# Patient Record
Sex: FEMALE | Race: WHITE
[De-identification: names, ages, dates, MRNs, and addresses within clinical notes are randomized per-mention and may not be internally consistent; named-entity substitution may affect disease eponyms.]

---

## 2017-03-27 ENCOUNTER — HOSPITAL ENCOUNTER (OUTPATIENT)
Dept: HOSPITAL 45 - C.RADPV | Age: 67
Discharge: HOME | End: 2017-03-27
Attending: FAMILY MEDICINE
Payer: COMMERCIAL

## 2017-03-27 DIAGNOSIS — M25.561: Primary | ICD-10-CM

## 2017-03-27 DIAGNOSIS — M85.861: ICD-10-CM

## 2017-03-27 NOTE — DIAGNOSTIC IMAGING REPORT
RIGHT KNEE 2 VIEWS



CLINICAL HISTORY: Right knee pain.



FINDINGS: AP and crosstable lateral views of the right knee are obtained. No

prior studies are available for comparison at the time of dictation. The

skeletal structures are osteopenic. No fracture is seen. There is mild

tricompartmental degenerative joint space narrowing, greatest at the medial and

patellofemoral compartments. There are medial marginal osteophytes and beaking

of the tibial spine. No joint effusion is identified. The overlying soft tissues

are within normal limits.



IMPRESSION: Osteopenia and mild degenerative change as above. No acute bony

abnormality is seen.







Electronically signed by:  Joni Wright M.D.

3/27/2017 2:02 PM



Dictated Date/Time:  3/27/2017 2:01 PM

## 2017-04-20 ENCOUNTER — HOSPITAL ENCOUNTER (OUTPATIENT)
Dept: HOSPITAL 45 - C.CTS | Age: 67
Discharge: HOME | End: 2017-04-20
Attending: INTERNAL MEDICINE
Payer: COMMERCIAL

## 2017-04-20 DIAGNOSIS — K76.0: ICD-10-CM

## 2017-04-20 DIAGNOSIS — R91.1: Primary | ICD-10-CM

## 2017-04-20 NOTE — DIAGNOSTIC IMAGING REPORT
CT SCAN OF THE CHEST WITH IV CONTRAST



CLINICAL HISTORY: Pulmonary nodule.



COMPARISON STUDY:  Chest CT dated 10/28/2016.



TECHNIQUE: Following the IV administration of 92 cc of Optiray 320, CT scan of

the thorax was performed from the thoracic inlet to the upper abdomen. Images

are reviewed in the axial, sagittal, and coronal planes. IV contrast was

administered without complication.



CT DOSE: 278.25 mGy.cm



FINDINGS:



Thyroid: Imaged portions of the thyroid gland are normal in size and

attenuation.



Thoracic aorta: The thoracic aorta is normal in caliber and demonstrates

standard 3-vessel arch anatomy. No dissection is seen.



Pulmonary vasculature: The pulmonary trunk is normal in caliber. There are no

filling defects identified in the central pulmonary vessels to indicate

pulmonary embolus. Note that this examination was not protocoled for evaluation

of the pulmonary arteries.



Heart: The heart is normal in size and configuration, and without pericardial

effusion.



Lungs and pleural spaces: A small fat-containing Bochdalek hernia is identified

the right lung base. There is no lobar consolidation or pleural effusion. The

trachea and central airways are clear. Numerous calcified granulomas are

identified. There is increasing irregular branching airspace nodularity

identified in the right lower lobe on image #177. Foci of clustered nodularity

measure up to 2.0 cm. A 3 mm left lower lobe nodule image #143 is unchanged.

Small foci of tree-in-bud nodularity are seen within the left upper lobe on

image #104 and in the left lower lobe on image #116. Suture material is

identified in the right middle lobe.



Mediastinum: There is no mediastinal lymphadenopathy. Calcified mediastinal

lymph nodes are observed.



Neeru: Clear. There are calcified hilar lymph nodes.



Axillae: There is no axillary lymphadenopathy.



Upper abdomen: There is a 1.1 cm cyst in the right hepatic lobe. 2 additional

hepatic hypodensities in the right lobe seen on images #270 and #314 do not meet

criteria for cysts cyst and likely represent small hemangiomas. These are

unchanged. There is evidence of hepatic steatosis. A small hiatal hernia is

identified.



Skeletal structures: The skeletal structures are osteopenic. No lytic or blastic

bony lesions are seen.





IMPRESSION:



1. Increasing irregular foci of airspace nodularity are identified in the right

lower lobe as compared to 10/28/2016. The appearance strongly favors a chronic

infectious/inflammatory process. Neoplasm is considered less likely but is not

excluded. Clinical correlation will be required and bronchoscopy may provide

further information if clinically warranted. At a minimum, continued CT

follow-up is advised.



2. Foci of tree-in-bud nodularity are seen in the left upper and left lower

lobes. This also suggests a chronic infectious/inflammatory process, and is

similar to previous.



3. Suture material is seen in the right middle lobe. Correlation with the

patient's surgical history be required.



4. No lobar consolidation or pleural effusion is ill-defined.



5. There is evidence of remote granuloma is in flexion.



6. Hepatic steatosis.



7. Additional changes as above.







Electronically signed by:  Joni Wright M.D.

4/20/2017 9:11 AM



Dictated Date/Time:  4/20/2017 9:03 AM

## 2017-08-08 ENCOUNTER — HOSPITAL ENCOUNTER (OUTPATIENT)
Dept: HOSPITAL 45 - C.CTS | Age: 67
Discharge: HOME | End: 2017-08-08
Attending: INTERNAL MEDICINE
Payer: COMMERCIAL

## 2017-08-08 DIAGNOSIS — R91.1: Primary | ICD-10-CM

## 2017-08-08 DIAGNOSIS — R91.8: ICD-10-CM

## 2017-08-08 NOTE — DIAGNOSTIC IMAGING REPORT
(CHEST) THORAX WITHOUT



CT DOSE: 232.27 mGy.cm



HISTORY:      R91.1 Pulmonary nodule be done in 4 [id number]



TECHNIQUE: Multiaxial CT images of the chest were performed without contrast.  A

dose lowering technique was utilized adhering to the principles of ALARA.



COMPARISON: Chest CT 4/20/2017.



FINDINGS: No pleural effusions. No pneumothorax. The central airways are patent.

Stable 4 mm nodule within the left lower lobe on image 129. Right upper and

lower lobe calcified granulomas remain unchanged. Small patchy/nodular airspace

opacity within the left upper lobe have slightly progressed. Small area of

tree-in-bud nodular opacities within the superior segment of the left lower lobe

remains unchanged. Slight improvement in the irregular/nodular focal airspace

opacity within the periphery of the right lower lobe. Dominant focal nodular

density measures 7 mm, previously measuring 10 mm. A few scattered tree-in-bud

nodular airspace opacities within the right upper lobe. No suspicious lytic or

blastic osseous lesions. Calcified mediastinal and hilar lymph nodes are again

noted. Stable subcentimeter mediastinal lymph nodes. The heart is normal in

size. Tiny hiatus hernia. Normal caliber thoracic aorta. The unenhanced spleen,

and adrenal glands are unremarkable. Stable 9 mm hypodense lesion within the

left hepatic dome. This is too small to characterize but favors a hemangioma.



IMPRESSION: 



1. Slight improvement in the irregular/nodular focal airspace opacity within the

periphery the right lower lobe. This favors a chronic infectious process.

Continued six-month chest CT follow is recommended.

2. A few scattered tree-in-bud nodular airspace opacities within the lungs. The

majority are similar to the prior study. Slight progression of the small

patchy/nodular airspace opacities within the left upper lobe. These findings

favor a mild chronic atypical infectious process. .

3. Stable 4 mm indeterminate pulmonary nodule within the left lower lobe







Electronically signed by:  Shin Yañez M.D.

8/8/2017 11:39 AM



Dictated Date/Time:  8/8/2017 11:29 AM

## 2017-11-21 ENCOUNTER — HOSPITAL ENCOUNTER (OUTPATIENT)
Dept: HOSPITAL 45 - C.PAPS | Age: 67
Discharge: HOME | End: 2017-11-21
Attending: FAMILY MEDICINE
Payer: COMMERCIAL

## 2017-11-21 ENCOUNTER — HOSPITAL ENCOUNTER (OUTPATIENT)
Dept: HOSPITAL 45 - C.MAMM | Age: 67
Discharge: HOME | End: 2017-11-21
Attending: FAMILY MEDICINE
Payer: COMMERCIAL

## 2017-11-21 DIAGNOSIS — Z12.31: Primary | ICD-10-CM

## 2017-11-21 DIAGNOSIS — Z00.00: Primary | ICD-10-CM

## 2017-11-24 NOTE — MAMMOGRAPHY REPORT
BILATERAL FIRST EVER DIGITAL SCREENING MAMMOGRAM TOMOSYNTHESIS WITH CAD: 11/21/2017

CLINICAL HISTORY: Baseline examination. Patient has no complaints.  





TECHNIQUE:  Breast tomosynthesis in addition to standard 2D mammography was performed. Current study 
was also evaluated with a Computer Aided Detection (CAD) system.  



COMPARISON: No prior exams were available for comparison.   



BREAST COMPOSITION:  The tissue of both breasts is heterogeneously dense, which may obscure small mas
ses.  



FINDINGS: There are a few benign coarse calcifications in the right breast.  No suspicious mass, arch
itectural distortion or cluster of suspicious microcalcifications is seen.  



IMPRESSION:  ACR BI-RADS CATEGORY 1: NEGATIVE

There is no mammographic evidence of malignancy. A 1 year screening mammogram is recommended.  The pa
tient will receive written notification of the results.  





Approximately 10% of breast cancers are not detected with mammography. A negative mammographic report
 should not delay biopsy if a clinically suggestive mass is present.



Afshan Zapata M.D.          

ay/:11/21/2017 15:25:13  



Imaging Technologist: Vidya Shirley, [facility]

letter sent: Normal 1/2  

BI-RADS Code: ACR BI-RADS Category 1: Negative

## 2018-02-12 ENCOUNTER — HOSPITAL ENCOUNTER (OUTPATIENT)
Dept: HOSPITAL 45 - C.CTS | Age: 68
Discharge: HOME | End: 2018-02-12
Attending: INTERNAL MEDICINE
Payer: COMMERCIAL

## 2018-02-12 DIAGNOSIS — R91.1: Primary | ICD-10-CM

## 2018-02-12 NOTE — DIAGNOSTIC IMAGING REPORT
(CHEST) THORAX WITHOUT



CLINICAL HISTORY: 67 years-old Female presenting with R91.1 Pulmonary nodule. 



TECHNIQUE: Multidetector CT imaging of the chest was performed without the use

of intravenous contrast. IV contrast: None. A dose lowering technique was used

consistent with the principles of ALARA (as low as reasonably achievable).



COMPARISON: 8/8/2017.



CT DOSE (mGy.cm): The estimated cumulative dose is 259.58 mGy.cm.



FINDINGS:



 topogram: Unremarkable.



On soft tissue windows, normal thyroid and thoracic inlet. Calcified mediastinal

and hilar lymphadenopathy again noted. Evaluation of the negin limited without

intravenous contrast. Normal aorta. Normal heart size. No pericardial or pleural

effusion. Upper abdomen normal.



On lung windows, decreased tree-in-bud nodular opacities in the lateral basal

and anterobasal right lower lobe. However, interval increase in tree-in-bud

opacities in the posterior paramediastinal right upper lobe. Patchy similar

opacities in the posterior apical left upper lobe are stable to slightly

decreased from prior. Multiple calcified granulomata. No evidence of

bronchiectasis though mild bronchial wall thickening may be present. Central

airways patent.



On bone windows, degenerative changes of the spine.



IMPRESSION:

1.  Overall stable to slight decrease in multifocal nodular/tree-in-bud

opacities. The appearance favors a chronic infectious bronchiolitis such as

mycobacterium avium intracellulare or mycoplasma among other etiologies. Slight

interval worsening of opacities in the posterior segment of the right upper

lobe.



2.  Calcified hilar and mediastinal lymph nodes as well as calcified granulomata

consistent with chronic granulomatous disease.







Electronically signed by:  Evaristo Allen M.D.

2/12/2018 12:30 PM



Dictated Date/Time:  2/12/2018 12:21 PM